# Patient Record
Sex: MALE | Race: WHITE | NOT HISPANIC OR LATINO | URBAN - METROPOLITAN AREA
[De-identification: names, ages, dates, MRNs, and addresses within clinical notes are randomized per-mention and may not be internally consistent; named-entity substitution may affect disease eponyms.]

---

## 2019-11-19 ENCOUNTER — EMERGENCY (EMERGENCY)
Facility: HOSPITAL | Age: 29
LOS: 0 days | Discharge: HOME | End: 2019-11-19
Payer: COMMERCIAL

## 2019-11-19 VITALS
RESPIRATION RATE: 15 BRPM | HEIGHT: 69 IN | TEMPERATURE: 99 F | HEART RATE: 71 BPM | WEIGHT: 184.97 LBS | SYSTOLIC BLOOD PRESSURE: 138 MMHG | OXYGEN SATURATION: 99 % | DIASTOLIC BLOOD PRESSURE: 63 MMHG
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
## 2019-11-19 DIAGNOSIS — S42.024A NONDISPLACED FRACTURE OF SHAFT OF RIGHT CLAVICLE, INITIAL ENCOUNTER FOR CLOSED FRACTURE: ICD-10-CM

## 2019-11-19 DIAGNOSIS — M25.511 PAIN IN RIGHT SHOULDER: ICD-10-CM
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
## 2019-11-19 DIAGNOSIS — Z88.0 ALLERGY STATUS TO PENICILLIN: ICD-10-CM

## 2019-11-19 PROCEDURE — 23500 CLTX CLAVICULAR FX W/O MNPJ: CPT | Mod: 54

## 2019-11-19 PROCEDURE — 99284 EMERGENCY DEPT VISIT MOD MDM: CPT | Mod: 25

## 2019-11-19 PROCEDURE — 73030 X-RAY EXAM OF SHOULDER: CPT | Mod: 26,RT

## 2019-11-19 PROCEDURE — 73000 X-RAY EXAM OF COLLAR BONE: CPT | Mod: 26,RT

## 2019-11-19 RX ORDER — KETOROLAC TROMETHAMINE 30 MG/ML
60 SYRINGE (ML) INJECTION ONCE
Refills: 0 | Status: DISCONTINUED | OUTPATIENT
Start: 2019-11-19 | End: 2019-11-19

## 2019-11-19 RX ADMIN — Medication 60 MILLIGRAM(S): at 15:45

## 2019-11-19 RX ADMIN — Medication 60 MILLIGRAM(S): at 16:55

## 2019-11-19 NOTE — ED PROVIDER NOTE - OBJECTIVE STATEMENT
28 y/o male w/ no PMH presents complaining of R shoulder pain w/ no radiation x earlier today s/p trip and fall while playing football. Reports that he was running and he accidentally fell on his R anterior shoulder down to the ground. Reports that 30 y/o male w/ no PMH presents complaining of R shoulder pain w/ no radiation x earlier today s/p trip and fall while playing football. Reports that he was running and he accidentally fell on his R anterior shoulder down to the ground. Reports that he didn't take any meds for alleviation in sx and was brought here. States that he didn't injure any other part of his body and feels well otherwise without any other complaints/concerns.    Denies head injury, chest pain, dyspnea, headaches, dizziness, LOC, syncope, abdominal pain, n/v/d, numbness, weakness, or any other constitutional sx.

## 2019-11-19 NOTE — ED PROVIDER NOTE - CHPI ED SYMPTOMS NEG
no stiffness/no numbness/no tingling/no weakness/no bruising/no back pain/no deformity/no difficulty bearing weight/no fever

## 2019-11-19 NOTE — ED PROVIDER NOTE - MUSCULOSKELETAL MINIMAL EXAM
Pt has some tenderness localized near the R clavicle, there is some discomfort with range of motion of R shoulder, Normal range of motion of Rest of distal R extremity. L extremity is WNL, Sensation intact, pulses intact, strength is 5/5./RANGE OF MOTION LIMITED/neck supple/motor intact

## 2019-11-19 NOTE — ED PROVIDER NOTE - PATIENT PORTAL LINK FT
You can access the FollowMyHealth Patient Portal offered by [facility] by registering at the following website: [email or web address]. By joining [facility]’s FollowMyHealth portal, you will also be able to view your health information using other applications (apps) compatible with our system.

## 2019-11-19 NOTE — ED PROVIDER NOTE - CARE PROVIDER_API CALL
Baldomero Medina (DO)  Orthopaedic Surgery  [address] [place] [address]  Phone: [phone]  Fax: [phone]  Follow Up Time: 4-6 Days

## 2019-11-19 NOTE — ED PROVIDER NOTE - PROGRESS NOTE DETAILS
Pt made aware of all findings. F/U recommended with PCP. Return precautions explained. pt placed in a sling. JONNY explained., F/U with ortho recommended with week. Will D/C
